# Patient Record
Sex: MALE | Race: OTHER | HISPANIC OR LATINO | Employment: STUDENT | URBAN - METROPOLITAN AREA
[De-identification: names, ages, dates, MRNs, and addresses within clinical notes are randomized per-mention and may not be internally consistent; named-entity substitution may affect disease eponyms.]

---
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
## 2025-03-04 ENCOUNTER — HOSPITAL ENCOUNTER (EMERGENCY)
Facility: HOSPITAL | Age: 20
Discharge: HOME | End: 2025-03-04
Attending: INTERNAL MEDICINE
Payer: COMMERCIAL

## 2025-03-04 VITALS
HEART RATE: 79 BPM | WEIGHT: 169.75 LBS | DIASTOLIC BLOOD PRESSURE: 68 MMHG | SYSTOLIC BLOOD PRESSURE: 123 MMHG | TEMPERATURE: 98.5 F | BODY MASS INDEX: 26.19 KG/M2 | RESPIRATION RATE: 17 BRPM | OXYGEN SATURATION: 99 %

## 2025-03-04 DIAGNOSIS — J02.9 SORE THROAT: ICD-10-CM

## 2025-03-04 LAB
FLUAV AG UPPER RESP QL IA.RAPID: NEGATIVE
FLUBV AG UPPER RESP QL IA.RAPID: NEGATIVE
S PYO DNA THROAT QL NAA+PROBE: NOT DETECTED
SARS-COV+SARS-COV-2 AG RESP QL IA.RAPID: NEGATIVE

## 2025-03-04 PROCEDURE — 87651 STREP A DNA AMP PROBE: CPT | Performed by: INTERNAL MEDICINE

## 2025-03-04 PROCEDURE — 87804 INFLUENZA ASSAY W/OPTIC: CPT | Performed by: INTERNAL MEDICINE

## 2025-03-04 PROCEDURE — 99283 EMERGENCY DEPT VISIT LOW MDM: CPT

## 2025-03-04 PROCEDURE — 87811 SARS-COV-2 COVID19 W/OPTIC: CPT | Performed by: INTERNAL MEDICINE

## 2025-03-04 PROCEDURE — 99284 EMERGENCY DEPT VISIT MOD MDM: CPT | Performed by: INTERNAL MEDICINE

## 2025-03-04 RX ORDER — DEXAMETHASONE 4 MG/1
10 TABLET ORAL ONCE
Status: COMPLETED | OUTPATIENT
Start: 2025-03-04 | End: 2025-03-04

## 2025-03-04 RX ORDER — NAPROXEN 500 MG/1
500 TABLET ORAL 2 TIMES DAILY WITH MEALS
Qty: 30 TABLET | Refills: 0 | Status: SHIPPED | OUTPATIENT
Start: 2025-03-04

## 2025-03-04 RX ADMIN — DEXAMETHASONE 10 MG: 4 TABLET ORAL at 14:58

## 2025-03-04 NOTE — Clinical Note
Mitch Madrigal was seen and treated in our emergency department on 3/4/2025.                Diagnosis:     Mitch  .    He may return on this date: 03/07/2025         If you have any questions or concerns, please don't hesitate to call.      Maddie Murillo MD    ______________________________           _______________          _______________  Hospital Representative                              Date                                Time

## 2025-03-04 NOTE — Clinical Note
Mitch Madrigal was seen and treated in our emergency department on 3/4/2025.                Diagnosis:     Mitch  .    He may return on this date: 03/06/2025         If you have any questions or concerns, please don't hesitate to call.      Maddie Murillo MD    ______________________________           _______________          _______________  Hospital Representative                              Date                                Time

## 2025-03-07 NOTE — ED PROVIDER NOTES
Chief Complaint   Patient presents with    Vomiting     Vomiting last night that has since resolved, reports generalized ab pain and sore throat, coworkers sick with norovirus     HPI: Patient is a 19 y.o. male who presents with 1 days of sore throat which the patient describes at mild.  Patient also reports vomiting and abdominal pain last night that resolved without medications.  The patient has had contact with people with similar symptoms, reports multiple sick coworkers with norovirus.  The patient has not taken any medication.    No Known Allergies    Past Medical History:   Diagnosis Date    ADHD (attention deficit hyperactivity disorder)       Past Surgical History:   Procedure Laterality Date    CIRCUMCISION       Social History     Tobacco Use    Smoking status: Never    Smokeless tobacco: Never   Vaping Use    Vaping status: Every Day    Substances: Nicotine   Substance Use Topics    Alcohol use: Never    Drug use: Never       Nursing notes reviewed  Physical Exam:  ED Triage Vitals   Temperature Pulse Respirations Blood Pressure SpO2   03/04/25 1357 03/04/25 1357 03/04/25 1357 03/04/25 1359 03/04/25 1357   98.5 °F (36.9 °C) 79 17 123/68 99 %      Temp src Heart Rate Source Patient Position - Orthostatic VS BP Location FiO2 (%)   -- -- -- -- --             Pain Score       03/04/25 1357       3           ROS: Positive for sore throat, the remainder of a 10 organ system ROS was otherwise unremarkable.  General: awake, alert, no acute distress    Head: normocephalic, atraumatic    Eyes: no scleral icterus  Ears: external ears normal, hearing grossly intact  Nose: external exam grossly normal, positive nasal discharge  Neck: symmetric, No JVD noted, trachea midline  Pulmonary: no respiratory distress, no tachypnea noted  Cardiovascular: appears well perfused  Abdomen: no distention noted, nontender to palpation, no guarding, no rebound, no rigidity  Musculoskeletal: no deformities noted, tone normal  Neuro:  grossly non-focal  Psych: mood and affect appropriate    The patient is stable and has a history and physical exam consistent with a viral illness. COVID19 testing has been performed.  I considered the patient's other medical conditions as applicable/noted above in my medical decision making.  I offered labs and further diagnostic testing given vomiting and abdominal pain last night.  Patient declines, stating he feels completely fine without treatment and does not think he needs a further workup.  The patient is stable upon discharge. The plan is for supportive care at home.    The patient (and any family present) verbalized understanding of the discharge instructions and warnings that would necessitate return to the Emergency Department.  All questions were answered prior to discharge.    Medications   dexamethasone (DECADRON) tablet 10 mg (10 mg Oral Given 3/4/25 9192)     Final diagnoses:   Sore throat     Time reflects when diagnosis was documented in both MDM as applicable and the Disposition within this note       Time User Action Codes Description Comment    3/4/2025  2:47 PM Maddie Murillo Add [J02.9] Sore throat     3/4/2025  2:47 PM Maddie Murillo Modify [J02.9] Sore throat           ED Disposition       ED Disposition   Discharge    Condition   Stable    Date/Time   Tue Mar 4, 2025  2:47 PM    Comment   Mitch Madrigal discharge to home/self care.                   Follow-up Information    None       Discharge Medication List as of 3/4/2025  2:48 PM        START taking these medications    Details   menthol-cetylpyridinium (CEPACOL) 3 MG lozenge Take 1 lozenge (3 mg total) by mouth as needed for sore throat, Starting Tue 3/4/2025, Normal      !! naproxen (Naprosyn) 500 mg tablet Take 1 tablet (500 mg total) by mouth 2 (two) times a day with meals, Starting Tue 3/4/2025, Normal       !! - Potential duplicate medications found. Please discuss with provider.        CONTINUE these medications which have  NOT CHANGED    Details   acetaminophen (TYLENOL) 500 mg tablet Take 1 tablet (500 mg total) by mouth every 6 (six) hours as needed for mild pain or moderate pain, Starting Wed 11/8/2023, Normal      acyclovir (ZOVIRAX) 400 MG tablet Take 1 tablet (400 mg total) by mouth 3 (three) times a day for 7 days, Starting Sat 6/8/2024, Until Sat 6/15/2024, Normal      bacitracin topical ointment 500 units/g topical ointment Apply 1 large application topically in the morning and 1 large application in the evening., Starting Sun 5/22/2022, Normal      cloNIDine (CATAPRES) 0.1 mg tablet Every 12 hours, Starting Tue 8/5/2014, Historical Med      diphenhydrAMINE (BENADRYL) 2 % cream Apply 1 Application topically 3 (three) times a day as needed for itching for up to 10 days, Starting Tue 9/19/2023, Until Fri 9/29/2023 at 2359, Normal      hydrocortisone 1 % cream Apply to affected area 2 times daily, Normal      lidocaine (LIDODERM) 5 % Apply 1 patch topically over 12 hours daily Remove & Discard patch within 12 hours or as directed by MD, Starting Sun 12/8/2024, Normal      !! naproxen (Naprosyn) 500 mg tablet Take 1 tablet (500 mg total) by mouth 2 (two) times a day with meals, Starting Wed 11/8/2023, Normal      !! naproxen (NAPROSYN) 500 mg tablet Take 1 tablet (500 mg total) by mouth every 12 (twelve) hours as needed for moderate pain or mild pain, Starting Sun 12/8/2024, Normal      neomycin-polymyxin-hydrocortisone (CORTISPORIN) 0.35%-10,000 units/mL-1% otic suspension Administer 4 drops into ears every 8 (eight) hours for 7 days, Starting Sun 12/8/2024, Until Sun 12/15/2024, Normal       !! - Potential duplicate medications found. Please discuss with provider.        No discharge procedures on file.    Electronically Signed by       Maddie Murillo MD  03/07/25 5907

## 2025-07-25 ENCOUNTER — HOSPITAL ENCOUNTER (EMERGENCY)
Facility: HOSPITAL | Age: 20
Discharge: HOME | End: 2025-07-25
Attending: EMERGENCY MEDICINE
Payer: COMMERCIAL

## 2025-07-25 VITALS
WEIGHT: 171.96 LBS | BODY MASS INDEX: 26.54 KG/M2 | HEART RATE: 86 BPM | DIASTOLIC BLOOD PRESSURE: 59 MMHG | OXYGEN SATURATION: 98 % | TEMPERATURE: 98.2 F | RESPIRATION RATE: 18 BRPM | SYSTOLIC BLOOD PRESSURE: 114 MMHG

## 2025-07-25 DIAGNOSIS — J02.9 ACUTE PHARYNGITIS, UNSPECIFIED ETIOLOGY: Primary | ICD-10-CM

## 2025-07-25 LAB — S PYO DNA THROAT QL NAA+PROBE: NOT DETECTED

## 2025-07-25 PROCEDURE — 99283 EMERGENCY DEPT VISIT LOW MDM: CPT

## 2025-07-25 PROCEDURE — 87651 STREP A DNA AMP PROBE: CPT

## 2025-07-25 PROCEDURE — 99284 EMERGENCY DEPT VISIT MOD MDM: CPT

## 2025-07-25 RX ORDER — IBUPROFEN 600 MG/1
600 TABLET, FILM COATED ORAL ONCE
Status: COMPLETED | OUTPATIENT
Start: 2025-07-25 | End: 2025-07-25

## 2025-07-25 RX ADMIN — IBUPROFEN 600 MG: 600 TABLET ORAL at 10:05

## 2025-07-25 NOTE — Clinical Note
Mitch Madrigal was seen and treated in our emergency department on 7/25/2025.                Diagnosis:     Mitch  may return to work on return date.    He may return on this date: 07/28/2025         If you have any questions or concerns, please don't hesitate to call.      Radha Galarza PA-C    ______________________________           _______________          _______________  Hospital Representative                              Date                                Time

## 2025-07-25 NOTE — ED PROVIDER NOTES
"Time reflects when diagnosis was documented in both MDM as applicable and the Disposition within this note       Time User Action Codes Description Comment    7/25/2025 10:05 AM Radha Galarza [J02.9] Acute pharyngitis, unspecified etiology           ED Disposition       ED Disposition   Discharge    Condition   Stable    Date/Time   Fri Jul 25, 2025 10:05 AM    Comment   Mitch Madrigal discharge to home/self care.                   Assessment & Plan       Medical Decision Making  19 year old male presenting for sore throat.    DDx includes viral syndrome, strep throat, AOM, AOE, sinus infection, allergies  Viral panel offered a deferred, strep test performed.  Also reporting bug bite to left posterior calf. No cellulitic changes or fluctuance. Advised use of bacitracin/ neosporin tpoically. Used skin marker to draw borders and discussed indications for reevaluation.   Patient is in no acute distress.  Discussed supportive care and symptomatic management.     Discussed findings from the visit with the patient.  We had a conversation regarding supportive care and indications for return.  Recommended appropriate follow-up.  Patient and/or family understand and agree with plan.    Portions of the record may have been created with voice recognition software. Occasional use of the incorrect word or \"sound a like\" substitutions may have occurred due to the inherent limitations of voice recognition software. Read the chart carefully and recognize, using context, where substitutions have occurred.       Amount and/or Complexity of Data Reviewed  Labs: ordered.    Risk  Prescription drug management.             Medications   ibuprofen (MOTRIN) tablet 600 mg (600 mg Oral Given 7/25/25 1005)       ED Risk Strat Scores                    No data recorded                            History of Present Illness       Chief Complaint   Patient presents with    Sore Throat     Pt c/o sore throat since yesterday, denies fevers. "        Past Medical History[1]   Past Surgical History[2]   Family History[3]   Social History[4]   E-Cigarette/Vaping    E-Cigarette Use Current Every Day User       E-Cigarette/Vaping Substances    Nicotine Yes     THC No     CBD No     Flavoring No     Other No     Unknown No       I have reviewed and agree with the history as documented.     Patient presents with:  Sore Throat: Pt c/o sore throat since yesterday, denies fevers.       19 year old male presenting for sore throat x 1 day.  Reports mild congestion and nonproductive cough also x 1 day.  Denies fevers, chills, ear pain, postnasal drip, trouble swallowing. Also reports bug bite to left calf that he says is itchy and noticed this am. Did not see any bugs/ ticks.           Review of Systems   Constitutional:  Negative for chills and fever.   HENT:  Positive for congestion and sore throat. Negative for ear pain, postnasal drip, rhinorrhea, sinus pressure and sinus pain.    Eyes:  Negative for pain, discharge and itching.   Respiratory:  Negative for cough and shortness of breath.    Cardiovascular:  Negative for chest pain.   Gastrointestinal:  Negative for abdominal pain and vomiting.   Skin:  Negative for rash.   Neurological:  Negative for headaches.   All other systems reviewed and are negative.          Objective       ED Triage Vitals [07/25/25 0947]   Temperature Pulse Blood Pressure Respirations SpO2 Patient Position - Orthostatic VS   98.2 °F (36.8 °C) 86 114/59 18 98 % --      Temp Source Heart Rate Source BP Location FiO2 (%) Pain Score    Oral Monitor -- -- 8      Vitals      Date and Time Temp Pulse SpO2 Resp BP Pain Score FACES Pain Rating User   07/25/25 1005 -- -- -- -- -- 6 -- [facility]   07/25/25 0947 98.2 °F (36.8 °C) 86 98 % 18 114/59 8 -- AND            Physical Exam  Vitals and nursing note reviewed.   Constitutional:       General: He is not in acute distress.     Appearance: He is well-developed. He is not ill-appearing.   HENT:       Head: Normocephalic and atraumatic.      Right Ear: Tympanic membrane, ear canal and external ear normal.      Left Ear: Tympanic membrane, ear canal and external ear normal.      Nose: Nose normal.      Mouth/Throat:      Mouth: Mucous membranes are moist.      Pharynx: Oropharynx is clear. Posterior oropharyngeal erythema present. No oropharyngeal exudate.     Eyes:      Conjunctiva/sclera: Conjunctivae normal.       Cardiovascular:      Rate and Rhythm: Normal rate and regular rhythm.      Pulses: Normal pulses.   Pulmonary:      Effort: Pulmonary effort is normal. No respiratory distress.      Breath sounds: Normal breath sounds. No wheezing, rhonchi or rales.     Musculoskeletal:         General: No swelling.      Cervical back: Neck supple.     Skin:     General: Skin is warm and dry.      Capillary Refill: Capillary refill takes less than 2 seconds.          Neurological:      General: No focal deficit present.      Mental Status: He is alert and oriented to person, place, and time.     Psychiatric:         Mood and Affect: Mood normal.         Results Reviewed       Procedure Component Value Units Date/Time    Strep A PCR [[id number]] Collected: 07/25/25 1006    Lab Status: In process Specimen: Throat Updated: 07/25/25 1010            No orders to display       Procedures    ED Medication and Procedure Management   Prior to Admission Medications   Prescriptions Last Dose Informant Patient Reported? Taking?   acetaminophen (TYLENOL) 500 mg tablet   No No   Sig: Take 1 tablet (500 mg total) by mouth every 6 (six) hours as needed for mild pain or moderate pain   acyclovir (ZOVIRAX) 400 MG tablet   No No   Sig: Take 1 tablet (400 mg total) by mouth 3 (three) times a day for 7 days   bacitracin topical ointment 500 units/g topical ointment   No No   Sig: Apply 1 large application topically in the morning and 1 large application in the evening.   Patient not taking: Reported on 2/7/2023   cloNIDine (CATAPRES) 0.1  mg tablet   Yes No   Sig: Every 12 hours   Patient not taking: Reported on 2/7/2023   diphenhydrAMINE (BENADRYL) 2 % cream   No No   Sig: Apply 1 Application topically 3 (three) times a day as needed for itching for up to 10 days   hydrocortisone 1 % cream   No No   Sig: Apply to affected area 2 times daily   lidocaine (LIDODERM) 5 %   No No   Sig: Apply 1 patch topically over 12 hours daily Remove & Discard patch within 12 hours or as directed by MD   menthol-cetylpyridinium (CEPACOL) 3 MG lozenge   No No   Sig: Take 1 lozenge (3 mg total) by mouth as needed for sore throat   naproxen (NAPROSYN) 500 mg tablet   No No   Sig: Take 1 tablet (500 mg total) by mouth every 12 (twelve) hours as needed for moderate pain or mild pain   naproxen (Naprosyn) 500 mg tablet   No No   Sig: Take 1 tablet (500 mg total) by mouth 2 (two) times a day with meals   naproxen (Naprosyn) 500 mg tablet   No No   Sig: Take 1 tablet (500 mg total) by mouth 2 (two) times a day with meals   neomycin-polymyxin-hydrocortisone (CORTISPORIN) 0.35%-10,000 units/mL-1% otic suspension   No No   Sig: Administer 4 drops into ears every 8 (eight) hours for 7 days      Facility-Administered Medications: None     Discharge Medication List as of 7/25/2025 10:05 AM        CONTINUE these medications which have NOT CHANGED    Details   acetaminophen (TYLENOL) 500 mg tablet Take 1 tablet (500 mg total) by mouth every 6 (six) hours as needed for mild pain or moderate pain, Starting Wed 11/8/2023, Normal      acyclovir (ZOVIRAX) 400 MG tablet Take 1 tablet (400 mg total) by mouth 3 (three) times a day for 7 days, Starting Sat 6/8/2024, Until Sat 6/15/2024, Normal      bacitracin topical ointment 500 units/g topical ointment Apply 1 large application topically in the morning and 1 large application in the evening., Starting Sun 5/22/2022, Normal      cloNIDine (CATAPRES) 0.1 mg tablet Every 12 hours, Starting Tue 8/5/2014, Historical Med      diphenhydrAMINE  (BENADRYL) 2 % cream Apply 1 Application topically 3 (three) times a day as needed for itching for up to 10 days, Starting Tue 9/19/2023, Until Fri 9/29/2023 at 2359, Normal      hydrocortisone 1 % cream Apply to affected area 2 times daily, Normal      lidocaine (LIDODERM) 5 % Apply 1 patch topically over 12 hours daily Remove & Discard patch within 12 hours or as directed by MD, Starting Sun 12/8/2024, Normal      menthol-cetylpyridinium (CEPACOL) 3 MG lozenge Take 1 lozenge (3 mg total) by mouth as needed for sore throat, Starting Tue 3/4/2025, Normal      !! naproxen (Naprosyn) 500 mg tablet Take 1 tablet (500 mg total) by mouth 2 (two) times a day with meals, Starting Wed 11/8/2023, Normal      !! naproxen (NAPROSYN) 500 mg tablet Take 1 tablet (500 mg total) by mouth every 12 (twelve) hours as needed for moderate pain or mild pain, Starting Sun 12/8/2024, Normal      !! naproxen (Naprosyn) 500 mg tablet Take 1 tablet (500 mg total) by mouth 2 (two) times a day with meals, Starting Tue 3/4/2025, Normal      neomycin-polymyxin-hydrocortisone (CORTISPORIN) 0.35%-10,000 units/mL-1% otic suspension Administer 4 drops into ears every 8 (eight) hours for 7 days, Starting Sun 12/8/2024, Until Sun 12/15/2024, Normal       !! - Potential duplicate medications found. Please discuss with provider.        No discharge procedures on file.  ED SEPSIS DOCUMENTATION   Time reflects when diagnosis was documented in both MDM as applicable and the Disposition within this note       Time User Action Codes Description Comment    7/25/2025 10:05 AM Radha Galarza Add [J02.9] Acute pharyngitis, unspecified etiology                    Radha Galarza PA-C  07/25/25 1026         [1]   Past Medical History:  Diagnosis Date    ADHD (attention deficit hyperactivity disorder)    [2]   Past Surgical History:  Procedure Laterality Date    CIRCUMCISION     [3]   Family History  Problem Relation Name Age of Onset    No Known Problems  Mother      No Known Problems Father     [4]   Social History  Tobacco Use    Smoking status: Never    Smokeless tobacco: Never   Vaping Use    Vaping status: Every Day    Substances: Nicotine   Substance Use Topics    Alcohol use: Never    Drug use: Never        Radha Galarza PA-C  07/25/25 1028